# Patient Record
Sex: MALE | URBAN - METROPOLITAN AREA
[De-identification: names, ages, dates, MRNs, and addresses within clinical notes are randomized per-mention and may not be internally consistent; named-entity substitution may affect disease eponyms.]

---

## 2023-09-27 ENCOUNTER — HOSPITAL ENCOUNTER (EMERGENCY)
Facility: MEDICAL CENTER | Age: 28
End: 2023-09-28
Attending: EMERGENCY MEDICINE

## 2023-09-27 DIAGNOSIS — H57.11 PAIN OF RIGHT EYE: ICD-10-CM

## 2023-09-27 PROCEDURE — 99283 EMERGENCY DEPT VISIT LOW MDM: CPT

## 2023-09-28 VITALS
TEMPERATURE: 97 F | DIASTOLIC BLOOD PRESSURE: 70 MMHG | HEIGHT: 68 IN | RESPIRATION RATE: 17 BRPM | HEART RATE: 60 BPM | WEIGHT: 183.86 LBS | OXYGEN SATURATION: 98 % | SYSTOLIC BLOOD PRESSURE: 125 MMHG | BODY MASS INDEX: 27.87 KG/M2

## 2023-09-28 RX ORDER — PROPARACAINE HYDROCHLORIDE 5 MG/ML
1 SOLUTION/ DROPS OPHTHALMIC ONCE
Status: COMPLETED | OUTPATIENT
Start: 2023-09-28 | End: 2023-09-28

## 2023-09-28 NOTE — ED PROVIDER NOTES
"ED Provider Note    CHIEF COMPLAINT  Chief Complaint   Patient presents with    Eye Pain     Right eye pain.       HPI/ROS    Tuan Ann is a 28 y.o. male who presents with right eye pain and blurred vision in the right eye.  The patient states that been ongoing for about a week.  He has not had any rhinorrhea nor cough.  He has not had any associated fevers.  Does not have a headache.  He does not wear corrective lenses.    PAST MEDICAL HISTORY       SURGICAL HISTORY  patient denies any surgical history    FAMILY HISTORY  No family history on file.    SOCIAL HISTORY  Social History     Tobacco Use    Smoking status: Not on file    Smokeless tobacco: Not on file   Substance and Sexual Activity    Alcohol use: Not on file    Drug use: Not on file    Sexual activity: Not on file       CURRENT MEDICATIONS  Home Medications    **Home medications have not yet been reviewed for this encounter**         ALLERGIES  No Known Allergies    PHYSICAL EXAM  VITAL SIGNS: /70   Pulse 60   Temp 36.1 °C (97 °F) (Temporal)   Resp 17   Ht 1.727 m (5' 8\")   Wt 83.4 kg (183 lb 13.8 oz)   SpO2 98%   BMI 27.96 kg/m²    In general the patient does not appear toxic    Facial examination no erythema    Eyes pupils are 3 and reactive bilaterally and extraocular motors intact.  There is no obvious conjunctival injection.  Of note as I was waiting for supplies as well as the slit lamp the patient did elope and I could not do a formal slit-lamp examination on the right eye.  Nor is able to check intraocular pressure.    Neck was supple      COURSE & MEDICAL DECISION MAKING    This a 28-year-old male who presents the emergency department right eye pain as well as decreased vision.  The patient did elope before I could complete the exam with the slit lamp as well as fluorescein.    FINAL DIAGNOSIS  Right eye pain    Disposition  The patient eloped.       Electronically signed by: Brenden Zurita M.D., 9/28/2023 12:50 AM      "

## 2023-09-28 NOTE — ED NOTES
"Pt family repeatedly coming out and asking \"how much longer.\" This RN educated pt that everything for eye exam was at bedside and ERP would be in ASAP for eye exam. No evidence of learning.   "

## 2023-09-28 NOTE — ED NOTES
Pt left AMA without ERP performing eye exam. Pt left without signing AMA paperwork and left before discharge education.

## 2023-09-28 NOTE — ED NOTES
Pt ambulatory with steady gait to room from lobby. Chart up for next available ERP. Call light within reach.

## 2023-09-28 NOTE — ED TRIAGE NOTES
29 y/o male BIB family with cc of right eye pain that began last Monday, 7/10, with intermittent blurred vision. Pt is not sure what caused the pain, denies trauma. No drainage noted, no obvious foreign bodies. Denies use of contacts of glasses. Pt is Aox4.